# Patient Record
Sex: FEMALE | Race: WHITE | NOT HISPANIC OR LATINO | Employment: FULL TIME | URBAN - NONMETROPOLITAN AREA
[De-identification: names, ages, dates, MRNs, and addresses within clinical notes are randomized per-mention and may not be internally consistent; named-entity substitution may affect disease eponyms.]

---

## 2020-11-05 NOTE — PROGRESS NOTES
Electrophysiology Clinic Consult     Aristides oMncada  1980  [unfilled]  [unfilled]    11/06/20    DATE OF ADMISSION: (Not on file)  [facility] CARDIOLOGY    Randy Milian MD  [address] / [place] [address]    Referring MD.: Dr. Lugo      Chief Complaint   Patient presents with   • Rapid Heart Rate     Problem List:  1. SVT  a. Echocardiogram 5/11/12: EF 60-65%, mild MR  b. RFA of RAT 11/2018- Dr. Bae  c. Holter Monitor 7/24/2019: NSR  bpm, average HR 91 bpm. No SVT  d. Event Monitor 9/15-9/27/2020: NSR  bpm, no SVT, NSAT  e. Reported echocardiogram 7/2020: normal LVEF, no valve disease  2. Surgical History  a. cholecystectomy      History of Present Illness:     39-year-old white female referred today for management of tachycardia status post ablation.  Patient states that she has had a longstanding history of tachyarrhythmias for many years.  Her typical complex involves fast heart rhythms followed by lightheadedness and ed syncope on multiple occasions.  She states that the tachycardia starts like a light switch and then terminates like a light switch as well.  Her symptoms are severe in nature.  Mostly the episodes last minutes in duration.  They start with a funny sensation in her chest.  They can occur at rest or with exertion.  She does have a history of migraines and sometimes these episodes trigger her migraines.  She underwent electrophysiologic study in 2018 at [facility].  She states that she did well for approximately 5 to 6 months but then had recurrent tachyarrhythmias since then.  She has had 2 monitors during which time she has not had any sustained atrial arrhythmias.  She states that these episodes occur 1-2 times per month.  There is no relieving or triggering factors identified.    Allergies   Allergen Reactions   • Sulfa Antibiotics Rash        Cannot display prior to admission medications because the patient  "has not been admitted in this contact.            Current Outpatient Medications:   •  Norethin Ace-Eth Estrad-FE (BLISOVI 24 FE PO), Take  by mouth., Disp: , Rfl:     Social History     Socioeconomic History   • Marital status:      Spouse name: Not on file   • Number of children: Not on file   • Years of education: Not on file   • Highest education level: Not on file   Tobacco Use   • Smoking status: Never Smoker   • Smokeless tobacco: Never Used   Substance and Sexual Activity   • Alcohol use: Never     Frequency: Never   • Drug use: Never   • Sexual activity: Defer       Family History   Problem Relation Age of Onset   • Heart attack Father        REVIEW OF SYSTEMS:   CONST:  No weight loss, fever, chills, weakness + fatigue.   HEENT:  No visual loss, blurred vision, double vision, yellow sclerae.                   No hearing loss, congestion, sore throat.   SKIN:      No rashes, urticaria, ulcers, sores.     RESP:     No shortness of breath, hemoptysis, cough, sputum.   GI:           No anorexia, nausea, vomiting, diarrhea. No abdominal pain, melena.   :         No burning on urination, hematuria or increased frequency.  ENDO:    No diaphoresis, cold or heat intolerance. No polyuria or polydipsia.   NEURO:  + headache, + dizziness, + syncope, No paralysis, ataxia, or parasthesias.  GI ;  No change in bowel or bladder control. No history of CVA/TIA  MUSC:    No muscle, back pain, joint pain or stiffness.   HEME:    No anemia, bleeding, bruising. No history of DVT/PE.  PSYCH:  No history of depression, anxiety    Vitals:    11/06/20 1207   BP: 118/78   BP Location: Left arm   Patient Position: Sitting   Pulse: 71   Weight: 65.8 kg (145 lb)   Height: 157.5 cm (62\")                 Physical Exam:  GEN: Well nourished, well-developed, no acute distress  HEENT: Normocephalic, atraumatic, PERRLA, moist mucous membranes  NECK: Supple, NO JVD, no thyromegaly, no lymphadenopathy  CARD: S1S2, RRR, no murmur, " gallop, rub, PMI is normal.  LUNGS: Clear to auscultation, normal respiratory effort  ABDOMEN: Soft, nontender, normal bowel sounds  EXTREMITIES: No gross deformities, no clubbing, cyanosis, or edema  SKIN: Warm, dry  NEURO: No focal deficits, alert and oriented x 3  PSYCHIATRIC: Normal affect and mood      I personally viewed and interpreted the patient's EKG/Telemetry/lab data    No results found for: GLUCOSE, CALCIUM, NA, K, CO2, CL, BUN, CREATININE, EGFRIFAFRI, EGFRIFNONA, BCR, ANIONGAP  No results found for: WBC, HGB, HCT, MCV, PLT  No results found for: INR, PROTIME  No results found for: TSH, V7CWEPI, Y5IULAL, THYROIDAB          ECG 12 Lead    Date/Time: 11/6/2020 12:21 PM  Performed by: Bryan Beckford MD  Authorized by: Bryan Beckford MD   Comparison: not compared with previous ECG   Previous ECG: no previous ECG available  Rhythm: sinus rhythm  BPM: 70                ICD-10-CM ICD-9-CM   1. Palpitations  R00.2 785.1   2. Atrial tachycardia (CMS/[facility])  I47.1 427.89       Assessment and Plan:     1.  Tachypalpitations following SVT/right atrial tachycardia ablation with nonsustained atrial tachycardia on event recorder.  At this point, I long discussion with the patient.  I would favor a trial of antiarrhythmic therapy flecainide 100 mg p.o. twice daily in attempt to see if this suppresses her symptomatology.  We did discuss repeat EP study with possible  ablation if the flecainide fails or she does not want to take long-term medication.  She is more interested in trying medical therapy for now rather than ablative therapy.  She is to get a twelve-lead EKG 48 hours after initiation of her flecainide therapy.    Thank you for this interesting consult.  We will see her back to see how she is doing on flecainide.  Will rediscuss with her in the near future about ablative therapy as needed.

## 2020-11-06 ENCOUNTER — CONSULT (OUTPATIENT)
Dept: CARDIOLOGY | Facility: CLINIC | Age: 40
End: 2020-11-06

## 2020-11-06 VITALS
HEIGHT: 62 IN | HEART RATE: 71 BPM | DIASTOLIC BLOOD PRESSURE: 78 MMHG | SYSTOLIC BLOOD PRESSURE: 118 MMHG | BODY MASS INDEX: 26.68 KG/M2 | WEIGHT: 145 LBS

## 2020-11-06 DIAGNOSIS — I47.1 ATRIAL TACHYCARDIA (HCC): ICD-10-CM

## 2020-11-06 DIAGNOSIS — R00.2 PALPITATIONS: Primary | ICD-10-CM

## 2020-11-06 PROCEDURE — 99244 OFF/OP CNSLTJ NEW/EST MOD 40: CPT | Performed by: INTERNAL MEDICINE

## 2020-11-06 PROCEDURE — 93000 ELECTROCARDIOGRAM COMPLETE: CPT | Performed by: INTERNAL MEDICINE

## 2021-03-04 PROBLEM — R00.2 PALPITATIONS: Status: ACTIVE | Noted: 2021-03-04

## 2021-03-04 PROBLEM — I47.1 ATRIAL TACHYCARDIA (HCC): Status: ACTIVE | Noted: 2021-03-04

## 2021-03-04 PROBLEM — I47.19 ATRIAL TACHYCARDIA: Status: ACTIVE | Noted: 2021-03-04

## 2025-06-18 ENCOUNTER — OFFICE VISIT (OUTPATIENT)
Dept: CARDIOLOGY | Facility: CLINIC | Age: 45
End: 2025-06-18
Payer: COMMERCIAL

## 2025-06-18 VITALS
HEART RATE: 87 BPM | WEIGHT: 153 LBS | DIASTOLIC BLOOD PRESSURE: 78 MMHG | HEIGHT: 62 IN | BODY MASS INDEX: 28.16 KG/M2 | OXYGEN SATURATION: 99 % | SYSTOLIC BLOOD PRESSURE: 116 MMHG

## 2025-06-18 DIAGNOSIS — R07.9 CHEST PAIN, UNSPECIFIED TYPE: Primary | ICD-10-CM

## 2025-06-18 RX ORDER — METOPROLOL TARTRATE 25 MG/1
25 TABLET, FILM COATED ORAL DAILY
COMMUNITY

## 2025-06-18 RX ORDER — ATORVASTATIN CALCIUM 20 MG/1
20 TABLET, FILM COATED ORAL DAILY
COMMUNITY
Start: 2025-03-26

## 2025-06-18 RX ORDER — MELOXICAM 15 MG/1
15 TABLET ORAL AS NEEDED
COMMUNITY
Start: 2025-05-27

## 2025-07-14 ENCOUNTER — TELEPHONE (OUTPATIENT)
Dept: CARDIOLOGY | Facility: CLINIC | Age: 45
End: 2025-07-14
Payer: COMMERCIAL

## 2025-07-16 ENCOUNTER — OFFICE VISIT (OUTPATIENT)
Dept: CARDIOLOGY | Facility: CLINIC | Age: 45
End: 2025-07-16
Payer: COMMERCIAL

## 2025-07-16 VITALS
HEIGHT: 62 IN | WEIGHT: 152 LBS | OXYGEN SATURATION: 99 % | SYSTOLIC BLOOD PRESSURE: 110 MMHG | BODY MASS INDEX: 27.97 KG/M2 | DIASTOLIC BLOOD PRESSURE: 80 MMHG | HEART RATE: 92 BPM

## 2025-07-16 DIAGNOSIS — I47.19 ATRIAL TACHYCARDIA: Primary | ICD-10-CM

## 2025-07-16 PROCEDURE — 99204 OFFICE O/P NEW MOD 45 MIN: CPT | Performed by: INTERNAL MEDICINE

## 2025-07-16 RX ORDER — DILTIAZEM HYDROCHLORIDE 180 MG/1
180 CAPSULE, EXTENDED RELEASE ORAL DAILY
Qty: 90 CAPSULE | Refills: 3 | Status: SHIPPED | OUTPATIENT
Start: 2025-07-16

## 2025-07-16 NOTE — PROGRESS NOTES
"        Cardiac Electrophysiology Outpatient Consult Note            [facility]    Consult Note     Aristides Moncada  [id number]  07/16/2025  [phone]     Primary Care Physician: Randy Milian MD    Referred By: Sonja Adam APRN    Subjective     Chief Complaint:   Diagnoses and all orders for this visit:    1. Atrial tachycardia (Primary)      Chief Complaint   Patient presents with    Chest Pain       History of Present Illness:   Aristides Moncada is a 44 y.o. female who presents to my electrophysiology clinic for evaluation of above.      Past Medical History:   Past Medical History:   Diagnosis Date    Abnormal ECG     Anemia     Pulmonary embolism     SVT (supraventricular tachycardia)        Past Surgical History:   Past Surgical History:   Procedure Laterality Date    CHOLECYSTECTOMY         Family History:   Family History   Problem Relation Age of Onset    Heart attack Father        Social History:   Social History     Socioeconomic History    Marital status:    Tobacco Use    Smoking status: Never    Smokeless tobacco: Never   Substance and Sexual Activity    Alcohol use: Never    Drug use: Never    Sexual activity: Defer       Medications:     Current Outpatient Medications:     atorvastatin (LIPITOR) 20 MG tablet, Take 1 tablet by mouth Daily., Disp: , Rfl:     meloxicam (MOBIC) 15 MG tablet, Take 1 tablet by mouth As Needed., Disp: , Rfl:     metoprolol tartrate (LOPRESSOR) 25 MG tablet, Take 1 tablet by mouth Daily., Disp: , Rfl:     Norethin Ace-Eth Estrad-FE (BLISOVI 24 FE PO), Take  by mouth., Disp: , Rfl:     VITAMIN D PO, Take 1 tablet by mouth 1 (One) Time Per Week., Disp: , Rfl:     Allergies:   Allergies   Allergen Reactions    Sulfa Antibiotics Rash       Objective   Vital Signs:   Vitals:    07/16/25 1228   BP: 110/80   BP Location: Left arm   Patient Position: Sitting   Pulse: 92   SpO2: 99%   Weight: 68.9 kg (152 lb)   Height: 157.5 cm (62\") " "      PHYSICAL EXAM  General appearance: Awake, alert, cooperative  Head: Normocephalic, without obvious abnormality, atraumatic  Eyes: Conjunctivae/corneas clear, EOMs intact  Neck: no adenopathy, no carotid bruit, no JVD, and thyroid: not enlarged  Lungs: clear to auscultation bilaterally and no rhonchi or crackles\", ' symmetric  Heart: regular rate and rhythm, S1, S2 normal, no murmur, click, rub or gallop  Abdomen: Soft, non-tender, bowel sounds normal,  no organomegaly  Extremities: extremities normal, atraumatic, no cyanosis or edema  Skin: Skin color, turgor normal, no rashes or lesions  Neurologic: Grossly normal     No results found for: \"GLUCOSE\", \"CALCIUM\", \"NA\", \"K\", \"CO2\", \"CL\", \"BUN\", \"CREATININE\", \"EGFRIFAFRI\", \"EGFRIFNONA\", \"BCR\", \"ANIONGAP\"  No results found for: \"WBC\", \"HGB\", \"HCT\", \"MCV\", \"PLT\"  No results found for: \"INR\", \"PROTIME\"  No results found for: \"TSH\", \"Q3VCDAN\", \"J9JFJAR\", \"THYROIDAB\"    Cardiac Testing:      I personally viewed and interpreted the patient's EKG/Telemetry/lab data    Procedures    Tobacco Cessation: N/A  Obstructive Sleep Apnea Screening: Completed    Advance Care Planning   ACP discussion was declined by the patient. Patient does not have an advance directive, declines further assistance.       Assessment & Plan    Diagnoses and all orders for this visit:    1. Atrial tachycardia (Primary)         Diagnosis Plan   1. Atrial tachycardia  -year-old female with survey normal heart by cardiac catheterization echocardiogram presents for recurrent symptoms of palpitations associated with atrial tachycardia.  Many years ago underwent a catheter ablation procedure for some sort of SVT at [facility] we do not have the records.  She did very well but now for the last 6 or so months has had different new symptoms.    He is not interested in a repeat ablation procedure.  We did discuss the option of different pharmacotherapy she feels, zoned out and poorly with her " beta-blocker I think a calcium channel blockers were trying to stop the Toprol due to Cardizem 100 mg daily instead        Body mass index is 27.8 kg/m².    I spent 45 minutes in consultation with this patient which included more than 65% of this time in direct face-to-face counseling, physical examination and discussion of my assessment and findings and this shared decision making with the patient.  The remainder of the time not spent face-to-face was performing one, some or all of the following actions: preparing to see the patient (e.g. reviewing tests, prior clinicians' notes, etc), ordering medications, tests or procedures, coordination of care, discussion of the plan with other healthcare providers, documenting clinical information in epic as well as independently interpreting results and communication of these results to the patient family and/or caregiver(s).  Please note that this explicitly excludes time spent on other separate billable services such as performing procedures or test interpretation, when applicable.    Follow Up:       Thank you for allowing me to participate in the care of your patient. Please to not hesitate to contact me with additional questions or concerns.      Pérez Jones DO, FACC, RS  Cardiac Electrophysiologist  [facility]